# Patient Record
Sex: FEMALE | Race: WHITE | Employment: UNEMPLOYED | URBAN - METROPOLITAN AREA
[De-identification: names, ages, dates, MRNs, and addresses within clinical notes are randomized per-mention and may not be internally consistent; named-entity substitution may affect disease eponyms.]

---

## 2021-11-04 PROBLEM — S63.641A RUPTURE OF ULNAR COLLATERAL LIGAMENT OF RIGHT THUMB: Status: ACTIVE | Noted: 2021-11-04

## 2021-11-04 PROBLEM — G56.03 BILATERAL CARPAL TUNNEL SYNDROME: Status: ACTIVE | Noted: 2021-11-04

## 2022-06-15 ENCOUNTER — LAB ENCOUNTER (OUTPATIENT)
Dept: LAB | Age: 37
End: 2022-06-15
Attending: ORTHOPAEDIC SURGERY
Payer: MEDICAID

## 2022-06-15 DIAGNOSIS — Z01.818 PRE-OP TESTING: ICD-10-CM

## 2022-06-16 LAB — SARS-COV-2 RNA RESP QL NAA+PROBE: NOT DETECTED

## 2022-06-17 ENCOUNTER — ANESTHESIA EVENT (OUTPATIENT)
Dept: SURGERY | Facility: HOSPITAL | Age: 37
End: 2022-06-17
Payer: MEDICAID

## 2022-06-17 ENCOUNTER — HOSPITAL ENCOUNTER (OUTPATIENT)
Facility: HOSPITAL | Age: 37
Setting detail: HOSPITAL OUTPATIENT SURGERY
Discharge: HOME | End: 2022-06-17
Attending: ORTHOPAEDIC SURGERY | Admitting: ORTHOPAEDIC SURGERY
Payer: MEDICAID

## 2022-06-17 ENCOUNTER — ANESTHESIA (OUTPATIENT)
Dept: SURGERY | Facility: HOSPITAL | Age: 37
End: 2022-06-17
Payer: MEDICAID

## 2022-06-17 VITALS
OXYGEN SATURATION: 96 % | WEIGHT: 178 LBS | DIASTOLIC BLOOD PRESSURE: 69 MMHG | SYSTOLIC BLOOD PRESSURE: 102 MMHG | HEIGHT: 58 IN | BODY MASS INDEX: 37.36 KG/M2 | RESPIRATION RATE: 14 BRPM | HEART RATE: 63 BPM | TEMPERATURE: 98 F

## 2022-06-17 DIAGNOSIS — Z01.818 PRE-OP TESTING: Primary | ICD-10-CM

## 2022-06-17 LAB — B-HCG UR QL: NEGATIVE

## 2022-06-17 PROCEDURE — 81025 URINE PREGNANCY TEST: CPT

## 2022-06-17 PROCEDURE — 01N50ZZ RELEASE MEDIAN NERVE, OPEN APPROACH: ICD-10-PCS | Performed by: ORTHOPAEDIC SURGERY

## 2022-06-17 RX ORDER — HYDROMORPHONE HYDROCHLORIDE 1 MG/ML
0.4 INJECTION, SOLUTION INTRAMUSCULAR; INTRAVENOUS; SUBCUTANEOUS EVERY 5 MIN PRN
Status: DISCONTINUED | OUTPATIENT
Start: 2022-06-17 | End: 2022-06-17

## 2022-06-17 RX ORDER — MORPHINE SULFATE 10 MG/ML
6 INJECTION, SOLUTION INTRAMUSCULAR; INTRAVENOUS EVERY 10 MIN PRN
Status: DISCONTINUED | OUTPATIENT
Start: 2022-06-17 | End: 2022-06-17

## 2022-06-17 RX ORDER — ONDANSETRON 2 MG/ML
4 INJECTION INTRAMUSCULAR; INTRAVENOUS EVERY 6 HOURS PRN
OUTPATIENT
Start: 2022-06-17

## 2022-06-17 RX ORDER — NALOXONE HYDROCHLORIDE 0.4 MG/ML
80 INJECTION, SOLUTION INTRAMUSCULAR; INTRAVENOUS; SUBCUTANEOUS AS NEEDED
Status: DISCONTINUED | OUTPATIENT
Start: 2022-06-17 | End: 2022-06-17

## 2022-06-17 RX ORDER — CEFAZOLIN SODIUM/WATER 2 G/20 ML
2 SYRINGE (ML) INTRAVENOUS ONCE
Status: COMPLETED | OUTPATIENT
Start: 2022-06-17 | End: 2022-06-17

## 2022-06-17 RX ORDER — SODIUM CHLORIDE, SODIUM LACTATE, POTASSIUM CHLORIDE, CALCIUM CHLORIDE 600; 310; 30; 20 MG/100ML; MG/100ML; MG/100ML; MG/100ML
INJECTION, SOLUTION INTRAVENOUS CONTINUOUS
Status: DISCONTINUED | OUTPATIENT
Start: 2022-06-17 | End: 2022-06-17

## 2022-06-17 RX ORDER — ONDANSETRON 2 MG/ML
4 INJECTION INTRAMUSCULAR; INTRAVENOUS EVERY 6 HOURS PRN
Status: DISCONTINUED | OUTPATIENT
Start: 2022-06-17 | End: 2022-06-17

## 2022-06-17 RX ORDER — LIDOCAINE HYDROCHLORIDE 10 MG/ML
INJECTION, SOLUTION EPIDURAL; INFILTRATION; INTRACAUDAL; PERINEURAL AS NEEDED
Status: DISCONTINUED | OUTPATIENT
Start: 2022-06-17 | End: 2022-06-17 | Stop reason: SURG

## 2022-06-17 RX ORDER — METOCLOPRAMIDE 10 MG/1
10 TABLET ORAL ONCE
Status: COMPLETED | OUTPATIENT
Start: 2022-06-17 | End: 2022-06-17

## 2022-06-17 RX ORDER — PROCHLORPERAZINE EDISYLATE 5 MG/ML
5 INJECTION INTRAMUSCULAR; INTRAVENOUS EVERY 8 HOURS PRN
Status: DISCONTINUED | OUTPATIENT
Start: 2022-06-17 | End: 2022-06-17

## 2022-06-17 RX ORDER — MORPHINE SULFATE 4 MG/ML
2 INJECTION, SOLUTION INTRAMUSCULAR; INTRAVENOUS EVERY 10 MIN PRN
Status: DISCONTINUED | OUTPATIENT
Start: 2022-06-17 | End: 2022-06-17

## 2022-06-17 RX ORDER — HYDROCODONE BITARTRATE AND ACETAMINOPHEN 5; 325 MG/1; MG/1
1 TABLET ORAL ONCE
Status: COMPLETED | OUTPATIENT
Start: 2022-06-17 | End: 2022-06-17

## 2022-06-17 RX ORDER — ACETAMINOPHEN 500 MG
1000 TABLET ORAL ONCE
Status: COMPLETED | OUTPATIENT
Start: 2022-06-17 | End: 2022-06-17

## 2022-06-17 RX ORDER — HYDROCODONE BITARTRATE AND ACETAMINOPHEN 5; 325 MG/1; MG/1
1 TABLET ORAL EVERY 4 HOURS PRN
Qty: 10 TABLET | Refills: 0 | Status: SHIPPED | OUTPATIENT
Start: 2022-06-17

## 2022-06-17 RX ORDER — MIDAZOLAM HYDROCHLORIDE 1 MG/ML
INJECTION INTRAMUSCULAR; INTRAVENOUS AS NEEDED
Status: DISCONTINUED | OUTPATIENT
Start: 2022-06-17 | End: 2022-06-17 | Stop reason: SURG

## 2022-06-17 RX ORDER — HYDROMORPHONE HYDROCHLORIDE 1 MG/ML
0.2 INJECTION, SOLUTION INTRAMUSCULAR; INTRAVENOUS; SUBCUTANEOUS EVERY 5 MIN PRN
Status: DISCONTINUED | OUTPATIENT
Start: 2022-06-17 | End: 2022-06-17

## 2022-06-17 RX ORDER — FAMOTIDINE 20 MG/1
20 TABLET, FILM COATED ORAL ONCE
Status: COMPLETED | OUTPATIENT
Start: 2022-06-17 | End: 2022-06-17

## 2022-06-17 RX ORDER — HYDROMORPHONE HYDROCHLORIDE 1 MG/ML
0.6 INJECTION, SOLUTION INTRAMUSCULAR; INTRAVENOUS; SUBCUTANEOUS EVERY 5 MIN PRN
Status: DISCONTINUED | OUTPATIENT
Start: 2022-06-17 | End: 2022-06-17

## 2022-06-17 RX ORDER — MORPHINE SULFATE 4 MG/ML
4 INJECTION, SOLUTION INTRAMUSCULAR; INTRAVENOUS EVERY 10 MIN PRN
Status: DISCONTINUED | OUTPATIENT
Start: 2022-06-17 | End: 2022-06-17

## 2022-06-17 RX ADMIN — SODIUM CHLORIDE, SODIUM LACTATE, POTASSIUM CHLORIDE, CALCIUM CHLORIDE: 600; 310; 30; 20 INJECTION, SOLUTION INTRAVENOUS at 11:51:00

## 2022-06-17 RX ADMIN — MIDAZOLAM HYDROCHLORIDE 2 MG: 1 INJECTION INTRAMUSCULAR; INTRAVENOUS at 11:53:00

## 2022-06-17 RX ADMIN — SODIUM CHLORIDE, SODIUM LACTATE, POTASSIUM CHLORIDE, CALCIUM CHLORIDE: 600; 310; 30; 20 INJECTION, SOLUTION INTRAVENOUS at 12:13:00

## 2022-06-17 RX ADMIN — LIDOCAINE HYDROCHLORIDE 50 MG: 10 INJECTION, SOLUTION EPIDURAL; INFILTRATION; INTRACAUDAL; PERINEURAL at 11:56:00

## 2022-06-17 RX ADMIN — CEFAZOLIN SODIUM/WATER 2 G: 2 G/20 ML SYRINGE (ML) INTRAVENOUS at 12:02:00

## 2022-06-17 NOTE — OPERATIVE REPORT
[facility]    PATIENT'S NAME: Willie More   ATTENDING PHYSICIAN: Gary Alvarez MD   OPERATING PHYSICIAN: Gary Alvarez MD   PATIENT ACCOUNT#:   [de-identified]    LOCATION:  [place] [address]  MEDICAL RECORD #:   [id number]       YOB: 1985  ADMISSION DATE:       06/17/2022      OPERATION DATE:  06/17/2022    OPERATIVE REPORT      PREOPERATIVE DIAGNOSIS:  Left carpal tunnel syndrome. POSTOPERATIVE DIAGNOSIS:  Left carpal tunnel syndrome. PROCEDURE:  Left carpal tunnel release. ANESTHESIA:  Sedation and supplemental local anesthesia. BLOOD LOSS:  Minimal.    COMPLICATIONS:  None. CONDITION:  The patient was transferred to the recovery room in stable condition. INDICATIONS:  The patient is a 40-year-old female who, based on history, physical examination, and EMG/nerve conduction studies, was diagnosed as having left carpal tunnel syndrome. The risks, indications, and benefits of procedures of both operative and nonoperative treatments were thoroughly described to the patient. The patient desired surgery. Surgery recommended was left carpal tunnel release or neuroplasty of the median nerve at the wrist.  Risks include bleeding, infection, neurovascular injury, chronic pain, chronic disability, failure of the procedure, stiffness, recurrence, potential need for additional surgery, as well as anesthetic complications including death. The patient consented to the procedure. All of her questions were answered. She was in agreement with the treatment plan. OPERATIVE TECHNIQUE:  On 06/17/2022, the patient was seen and evaluated preoperatively. Her left wrist was identified as the correct operative site; my initials were placed. She was transferred to the operating room and transferred to the operating table in supine position.   Under sterile conditions, local anesthesia with 1% lidocaine and 0.5% Marcaine in a 1:1 mixture was given to the planned site of incision in the form of a median nerve block. Her left upper extremity was prepped and draped in sterile fashion. Tourniquet was inflated to 250 mmHg following Esmarch exsanguination. She had a longitudinal incision made beginning at [facility] line distally, extending proximally along the radial aspect of the fourth ray with sharp longitudinal dissection through the palmar fascia. The transverse carpal ligament was identified and released from distal to proximal extent. The median nerve was identified and protected. The [facility] dilators were used to dilate both superficial and deep to the transverse carpal ligament. Then, the [address] [employer] was used to divide the transverse carpal ligament from distal to proximal extent. Visual inspection and palpation revealed complete release of the transverse carpal ligament. The wound was then thoroughly and copiously irrigated, and skin was closed with 4-0 Prolene in horizontal mattress fashion. A large, sterile, bulky, soft dressing was placed. Tourniquet was deflated following wound closure. Blood loss was minimal.  Complications were none. DISPOSITION:  She was discharged to home in stable condition with a pain prescription, written instructions, and 24-hour access emergency numbers. She will return to clinic in 10 to 14 days for repeat clinical evaluation and suture removal.  All of her questions were answered, and she was in agreement with the treatment plan.     Dictated By Brendan Nails MD  d: 06/17/2022 12:20:28  t: 06/17/2022 18:04:12  Job [id number]  JEL/

## 2022-06-17 NOTE — BRIEF OP NOTE
Pre-Operative Diagnosis: left carpal tunnel syndrome     Post-Operative Diagnosis: left carpal tunnel syndrome      Procedure Performed:   Left carpal tunnel release    Surgeon(s) and Role:     * Cely Saucedo MD - Primary    Assistant(s):  Surgical Assistant: Marissa De Los Santos PA-C     Surgical Findings: released     Specimen: none     Estimated Blood Loss: minimal    Dictation Number:  #[id number]    Barbara Obrien MD  6/17/2022  12:19 PM

## 2022-06-17 NOTE — H&P
[place]  4/18/1985  39year old   female  Elzbieta Mancini MD    HPI:   Patient presents with:  Right Hand - Pain  Hand Pain: f/u EMG for bilateral CTS, left greater than right. also right thumb UCL sprain that isnt really better    The patient complains of pain located bilateral hands and right thumb. The pain is staying the same. The patient does have numbness and tingling, left greater than right. The patient does not have skin laceration or breakdown. The brace for thumb hasn't really helped; she was wearing it mostly at night    ALLERGIES:  No Known Allergies   No current outpatient medications on file. HISTORY:  History reviewed. No pertinent past medical history. History reviewed. No pertinent surgical history. History reviewed. No pertinent family history. Social History  Tobacco Use  Smoking status: Not on file  Smokeless tobacco: Not on file  Alcohol use: Not on file  Drug use: Not on file        EXAM:   There were no vitals taken for this visit. The patient is awake and oriented x 3 and overall well appearing. The patient has normal mood. The patient is non-tender and atraumatic with the exception of their bilateral upper extremity. The patient's skin is intact and compartments are soft. The patient's upper extremities are warm and pink with brisk capillary refill and 2+ radial pulses. Sensation is intact to light touch in axillary, median, ulnar, and radial nerve distributions. The patient has 5/5 strength in finger flexion, finger extension, EPL, FPL, APB, and interosseous muscle function. The patient has a negative Spurling's test and Ziegler's test.  The patient has a positive carpal tunnel compression test, Phalen's test, and Tinel over the median nerve at the wrist, left greater than right. The patient has a negative cubital tunnel compression test, elbow flexion test, and Tinel over the ulnar nerve at the elbow.   The patient has normal two point discrimination at 4-5 mm in the median and ulnar nerve distributions. The patient's right thumb MCP joint is more stable to radial and ulnar deviation. It is stable to hyperextension. There is a residual effusion. There is full extension and decreased flexion    IMAGING AND TESTING:    EMG results reviewed (independent interpretation) show moderate CTS bilaterally    ASSESSMENT AND PLAN:   Rupture of ulnar collateral ligament of right thumb, subsequent encounter (primary encounter diagnosis)  Bilateral carpal tunnel syndrome, left>right    The risks, indications, benefits, and procedures of both operative and non-operative treatment were discussed with the patient. The patient desired surgery. Surgery recommended was left CTR. Risks include bleeding, infection, neurovascular injury, failure of the procedure, stiffness, and potential need for additional surgery as well as anesthetic complications including death. The patient consented to the procedure. She will continue her night splint for her right carpal tunnel. In regards to thumb, she will wear the brace full time for the next 4 weeks. Hopefully this continues to improve her thumb and she will not need surgery. All of their questions were answered and they are in agreement with the treatment plan. The patient will return to clinic in 2 weeks post op for further clinical evaluation.

## (undated) DEVICE — ENCORE® LATEX MICRO SIZE 6.5, STERILE LATEX POWDER-FREE SURGICAL GLOVE: Brand: ENCORE

## (undated) DEVICE — PADDING CAST WYTEX 2" STER

## (undated) DEVICE — ENCORE® LATEX ACCLAIM SIZE 7, STERILE LATEX POWDER-FREE SURGICAL GLOVE: Brand: ENCORE

## (undated) DEVICE — SOLUTION  .9 1000ML BTL

## (undated) DEVICE — GAUZE SPONGES,12 PLY: Brand: CURITY

## (undated) DEVICE — SUCTION CANISTER, 3000CC,SAFELINER: Brand: DEROYAL

## (undated) DEVICE — SUT PROLENE 4-0 PS-2 8682G

## (undated) DEVICE — STERILE TETRA-FLEX CF, ELASTIC BANDAGE, 2" X 5.5YD: Brand: TETRA-FLEX™CF

## (undated) DEVICE — UPPER EXTREMITY: Brand: MEDLINE INDUSTRIES, INC.

## (undated) DEVICE — STRETCH BANDAGE: Brand: CURITY

## (undated) DEVICE — OSTEOTOME INTM SGL SRG CTRS

## (undated) DEVICE — BANDAGE ROLL,100% COTTON, 6 PLY, SMALL: Brand: KERLIX